# Patient Record
Sex: FEMALE | Race: WHITE | NOT HISPANIC OR LATINO | URBAN - METROPOLITAN AREA
[De-identification: names, ages, dates, MRNs, and addresses within clinical notes are randomized per-mention and may not be internally consistent; named-entity substitution may affect disease eponyms.]

---

## 2017-11-08 ENCOUNTER — CLINICAL SUPPORT (OUTPATIENT)
Dept: INTERNAL MEDICINE | Facility: CLINIC | Age: 37
End: 2017-11-08

## 2017-11-08 PROCEDURE — 80305 DRUG TEST PRSMV DIR OPT OBS: CPT | Mod: ,,, | Performed by: INTERNAL MEDICINE

## 2018-07-28 ENCOUNTER — HOSPITAL ENCOUNTER (EMERGENCY)
Facility: HOSPITAL | Age: 38
Discharge: HOME | End: 2018-07-28
Attending: EMERGENCY MEDICINE
Payer: MEDICAID

## 2018-07-28 VITALS
SYSTOLIC BLOOD PRESSURE: 145 MMHG | RESPIRATION RATE: 18 BRPM | DIASTOLIC BLOOD PRESSURE: 90 MMHG | HEART RATE: 69 BPM | OXYGEN SATURATION: 97 % | TEMPERATURE: 99 F | WEIGHT: 166.69 LBS

## 2018-07-28 DIAGNOSIS — S39.012A BACK STRAIN, INITIAL ENCOUNTER: ICD-10-CM

## 2018-07-28 DIAGNOSIS — S20.212A RIB CONTUSION, LEFT, INITIAL ENCOUNTER: Primary | ICD-10-CM

## 2018-07-28 DIAGNOSIS — R07.81 RIB PAIN ON LEFT SIDE: ICD-10-CM

## 2018-07-28 DIAGNOSIS — M54.9 LEFT-SIDED BACK PAIN: ICD-10-CM

## 2018-07-28 LAB
B-HCG UR QL: NEGATIVE
CTP QC/QA: YES

## 2018-07-28 PROCEDURE — 96372 THER/PROPH/DIAG INJ SC/IM: CPT

## 2018-07-28 PROCEDURE — 63600175 PHARM REV CODE 636 W HCPCS: Performed by: EMERGENCY MEDICINE

## 2018-07-28 PROCEDURE — 99284 EMERGENCY DEPT VISIT MOD MDM: CPT | Mod: 25

## 2018-07-28 PROCEDURE — 81025 URINE PREGNANCY TEST: CPT | Performed by: EMERGENCY MEDICINE

## 2018-07-28 RX ORDER — NAPROXEN 375 MG/1
375 TABLET ORAL 2 TIMES DAILY WITH MEALS
Qty: 20 TABLET | Refills: 0 | OUTPATIENT
Start: 2018-07-28 | End: 2020-08-26

## 2018-07-28 RX ORDER — CYCLOBENZAPRINE HCL 10 MG
10 TABLET ORAL 3 TIMES DAILY PRN
Qty: 15 TABLET | Refills: 0 | Status: SHIPPED | OUTPATIENT
Start: 2018-07-28 | End: 2018-08-02

## 2018-07-28 RX ORDER — KETOROLAC TROMETHAMINE 30 MG/ML
30 INJECTION, SOLUTION INTRAMUSCULAR; INTRAVENOUS
Status: COMPLETED | OUTPATIENT
Start: 2018-07-28 | End: 2018-07-28

## 2018-07-28 RX ADMIN — KETOROLAC TROMETHAMINE 30 MG: 30 INJECTION, SOLUTION INTRAMUSCULAR at 08:07

## 2018-07-29 NOTE — ED PROVIDER NOTES
Encounter Date: 7/28/2018    SCRIBE #1 NOTE: I, Davy Rae, am scribing for, and in the presence of,  Dr. Irvin. I have scribed the entire note.       History     Chief Complaint   Patient presents with    Fall     pt to triage ambulatory and reports a slip and fall in tub last pm and has left arm and mid back pain     Clarice Thao is a 39y/o female who  has no past medical history on file.    The patient presents to the ED due to a slip and fall that occurred yesterday night. Patient reports falling while in the shower and landed on her mid upper back. She admits to left upper back pain; described as a constant, sharp aching pain, worse with palpation and raising left arm. Patient denies head trauma, LOC, abd pain, or any other trauma. She has tried taking 800 mg Ibuprofen for pain without improvement. At this time, she has no other pain or complaints.        The history is provided by the patient.     Review of patient's allergies indicates:  No Known Allergies  No past medical history on file.  No past surgical history on file.  No family history on file.  Social History   Substance Use Topics    Smoking status: Not on file    Smokeless tobacco: Not on file    Alcohol use Not on file     Review of Systems   Constitutional: Negative for chills and fever.   HENT: Negative for congestion, rhinorrhea and sore throat.    Eyes: Negative for redness and visual disturbance.   Respiratory: Negative for cough, shortness of breath and wheezing.    Cardiovascular: Negative for chest pain and palpitations.   Gastrointestinal: Negative for abdominal pain, diarrhea, nausea and vomiting.   Genitourinary: Negative for dysuria and hematuria.   Musculoskeletal: Positive for back pain and myalgias. Negative for neck pain.   Skin: Negative for rash.   Neurological: Negative for syncope, weakness and light-headedness.   Psychiatric/Behavioral: Negative for confusion.   All other systems reviewed and are negative.      Physical  Exam     Initial Vitals [07/28/18 1930]   BP Pulse Resp Temp SpO2   133/83 86 18 98.3 °F (36.8 °C) 96 %      MAP       --         Physical Exam    Nursing note and vitals reviewed.  Constitutional: She appears well-developed and well-nourished. She is not diaphoretic. No distress.   HENT:   Head: Normocephalic and atraumatic.   Right Ear: External ear normal.   Left Ear: External ear normal.   Nose: Nose normal.   Mouth/Throat: Oropharynx is clear and moist.   Eyes: Conjunctivae and EOM are normal. Pupils are equal, round, and reactive to light. Right eye exhibits no discharge. Left eye exhibits no discharge.   Neck: Normal range of motion. Neck supple.   Cardiovascular: Normal rate, regular rhythm, normal heart sounds and intact distal pulses. Exam reveals no gallop and no friction rub.    No murmur heard.  Pulmonary/Chest: Breath sounds normal. No respiratory distress. She has no wheezes. She has no rhonchi. She has no rales.   Abdominal: Soft. Bowel sounds are normal. She exhibits no distension. There is no tenderness. There is no rebound and no guarding.   Left flank tenderness to palpation.   Musculoskeletal: She exhibits tenderness. She exhibits no edema.   Left sided paraspinal thoracic and left sided posterior rib TTP. No crepitus.  No midline cervical, thoracic, or lumbar spine tenderness to palpation. No bony tenderness or step-offs. Normal alignment to entire spine.    No deformity noted to all extremities.  Limited ROM of left shoulder secondary to pain.   Neurological: She is alert and oriented to person, place, and time. She has normal strength. No cranial nerve deficit or sensory deficit.   Skin: Skin is warm and dry. Capillary refill takes less than 2 seconds. No rash noted.   Psychiatric: She has a normal mood and affect. Thought content normal.         ED Course   Procedures  Labs Reviewed   POCT URINE PREGNANCY          Imaging Results          X-Ray Ribs 2 View Left (Final result)  Result time  07/28/18 21:05:28    Final result by Wolf Camacho MD (07/28/18 21:05:28)                 Impression:      No displaced left rib fracture or associated complication in the chest.      Electronically signed by: Wolf Camacho MD  Date:    07/28/2018  Time:    21:05             Narrative:    EXAMINATION:  XR RIBS 2 VIEW LEFT    CLINICAL HISTORY:  Pleurodynia.    TECHNIQUE:  Two views of the left ribs were performed.    COMPARISON:  None.    FINDINGS:  No displaced rib fracture is identified, noting that nondisplaced and anterior rib fractures may be radiographically occult.  No left pneumothorax is identified.  No subcutaneous emphysema in the left chest wall.  There is linear subsegmental atelectasis noted at the left lung base.                               X-Ray Shoulder Trauma Left (Final result)  Result time 07/28/18 21:02:25    Final result by Wolf Camacho MD (07/28/18 21:02:25)                 Impression:      No acute bony abnormality.      Electronically signed by: Wolf Camacho MD  Date:    07/28/2018  Time:    21:02             Narrative:    EXAMINATION:  XR SHOULDER TRAUMA 3 VIEW LEFT    CLINICAL HISTORY:  Dorsalgia, unspecified.    TECHNIQUE:  Three views of the left shoulder.    COMPARISON:  None.    FINDINGS:  No acute fracture, dislocation, or bony erosion. Soft tissues are symmetric.  No radiopaque foreign body.                                 Medical Decision Making:   Initial Assessment:   Clarice Thao is a 39y/o female who presents to the ED due to a slip and fall that occurred yesterday night. Plan to XR, manage pain, and reassess.  Differential Diagnosis:   Contusion, fracture, dislocation  Clinical Tests:   Lab Tests: Ordered and Reviewed  Radiological Study: Ordered and Reviewed  ED Management:  Upon re-evaluation, the patient's status has improved.  After complete ED evaluation, clinical impression is most consistent with contusion.  At this time, I feel there is no emergent  condition requiring further evaluation or admission. I believe the patient is stable for discharge from the ED. The patient and any additional family present were updated with test results, overall clinical impression, and recommended further plan of care. All questions were answered. The patient expressed understanding and agreed with current plan for discharge with PCP follow-up within 1 week. Strict return precautions were provided, including worsening pain, return/worsening of current symptoms or any other concerns.                      ED Course as of Jul 29 1020   Sat Jul 28, 2018 2043 Patient states that pain has resolved after toradol    [LD]      ED Course User Index  [LD] Aida Irvin MD     Clinical Impression:     1. Rib contusion, left, initial encounter    2. Rib pain on left side    3. Left-sided back pain    4. Back strain, initial encounter            Disposition:   Disposition: Discharged  Condition: Stable       I, Aida Irvin,  personally performed the services described in this documentation. All medical record entries made by the scribe were at my direction and in my presence.  I have reviewed the chart and agree that the record reflects my personal performance and is accurate and complete. Aida Irvin M.D. 8:49 PM07/29/2018                   Aida Irvin MD  07/29/18 1020

## 2018-07-29 NOTE — ED NOTES
States she slipped and fell in tub last night.  Complaining of left shoulder, left upper arm and pain in left upper back area.

## 2020-08-23 ENCOUNTER — HOSPITAL ENCOUNTER (EMERGENCY)
Facility: HOSPITAL | Age: 40
Discharge: HOME | End: 2020-08-23
Attending: EMERGENCY MEDICINE
Payer: MEDICAID

## 2020-08-23 VITALS
OXYGEN SATURATION: 96 % | DIASTOLIC BLOOD PRESSURE: 70 MMHG | HEIGHT: 65 IN | BODY MASS INDEX: 29.99 KG/M2 | WEIGHT: 180 LBS | RESPIRATION RATE: 18 BRPM | HEART RATE: 75 BPM | TEMPERATURE: 99 F | SYSTOLIC BLOOD PRESSURE: 149 MMHG

## 2020-08-23 DIAGNOSIS — L02.91 ABSCESS: Primary | ICD-10-CM

## 2020-08-23 DIAGNOSIS — L03.90 CELLULITIS, UNSPECIFIED CELLULITIS SITE: ICD-10-CM

## 2020-08-23 PROCEDURE — 25000003 PHARM REV CODE 250: Mod: ER | Performed by: EMERGENCY MEDICINE

## 2020-08-23 PROCEDURE — 63600175 PHARM REV CODE 636 W HCPCS: Mod: ER | Performed by: EMERGENCY MEDICINE

## 2020-08-23 PROCEDURE — 56405 I&D VULVA/PERINEAL ABSCESS: CPT | Mod: ER

## 2020-08-23 PROCEDURE — 96372 THER/PROPH/DIAG INJ SC/IM: CPT | Mod: ER,59

## 2020-08-23 PROCEDURE — 99284 EMERGENCY DEPT VISIT MOD MDM: CPT | Mod: 25,ER

## 2020-08-23 RX ORDER — SULFAMETHOXAZOLE AND TRIMETHOPRIM 800; 160 MG/1; MG/1
1 TABLET ORAL
Status: COMPLETED | OUTPATIENT
Start: 2020-08-23 | End: 2020-08-23

## 2020-08-23 RX ORDER — CLONAZEPAM 0.5 MG/1
0.5 TABLET ORAL 2 TIMES DAILY PRN
COMMUNITY

## 2020-08-23 RX ORDER — KETOROLAC TROMETHAMINE 30 MG/ML
30 INJECTION, SOLUTION INTRAMUSCULAR; INTRAVENOUS
Status: COMPLETED | OUTPATIENT
Start: 2020-08-23 | End: 2020-08-23

## 2020-08-23 RX ORDER — LIDOCAINE HYDROCHLORIDE 10 MG/ML
10 INJECTION, SOLUTION EPIDURAL; INFILTRATION; INTRACAUDAL; PERINEURAL
Status: COMPLETED | OUTPATIENT
Start: 2020-08-23 | End: 2020-08-23

## 2020-08-23 RX ORDER — SULFAMETHOXAZOLE AND TRIMETHOPRIM 800; 160 MG/1; MG/1
1 TABLET ORAL 2 TIMES DAILY
Qty: 14 TABLET | Refills: 0 | Status: SHIPPED | OUTPATIENT
Start: 2020-08-23 | End: 2020-08-30

## 2020-08-23 RX ORDER — MORPHINE SULFATE 4 MG/ML
4 INJECTION, SOLUTION INTRAMUSCULAR; INTRAVENOUS
Status: DISCONTINUED | OUTPATIENT
Start: 2020-08-23 | End: 2020-08-23

## 2020-08-23 RX ORDER — HYDROCODONE BITARTRATE AND ACETAMINOPHEN 5; 325 MG/1; MG/1
1 TABLET ORAL EVERY 4 HOURS PRN
Qty: 8 TABLET | Refills: 0 | Status: SHIPPED | OUTPATIENT
Start: 2020-08-23 | End: 2020-08-26 | Stop reason: SDUPTHER

## 2020-08-23 RX ORDER — MONTELUKAST SODIUM 10 MG/1
10 TABLET ORAL NIGHTLY
COMMUNITY

## 2020-08-23 RX ORDER — SERTRALINE HYDROCHLORIDE 100 MG/1
100 TABLET, FILM COATED ORAL DAILY
COMMUNITY

## 2020-08-23 RX ADMIN — SULFAMETHOXAZOLE AND TRIMETHOPRIM 1 TABLET: 800; 160 TABLET ORAL at 10:08

## 2020-08-23 RX ADMIN — KETOROLAC TROMETHAMINE 30 MG: 30 INJECTION, SOLUTION INTRAMUSCULAR at 09:08

## 2020-08-23 RX ADMIN — LIDOCAINE HYDROCHLORIDE 100 MG: 10 INJECTION, SOLUTION EPIDURAL; INFILTRATION; INTRACAUDAL; PERINEURAL at 09:08

## 2020-08-23 NOTE — ED PROVIDER NOTES
"Encounter Date: 8/23/2020    COVID Statement  The  of Health and Human Services and Rowdy Puentes, Governor of the [facility], have declared a State of Public Health Emergency due to the spread of a novel coronavirus and disease (COVID-19).  There is no currently accepted treatment except conservative measures and respiratory support if appropriate.  This has lead to significant resource capacity and potential delays in care.            History     Chief Complaint   Patient presents with    Abscess     Pt states "I have an abscess all the way at the top of my leg, like almost in my private. I think it's infected." Pt states symptoms started on Thursday.      Patient is 39 y/o female with no significant PMHx who presents c/o pain and swelling to left labia, worsening for 4 days.  Pain is sharp and worse with movement and palpation  There are no alleviating factors  Denies any drainage.   Denies fever/chills.  Denies other symptoms.  She has not had anything like this in the past.    LMP was last week    The history is provided by the patient.     Review of patient's allergies indicates:  No Known Allergies  History reviewed. No pertinent past medical history.  History reviewed. No pertinent surgical history.  History reviewed. No pertinent family history.  Social History     Tobacco Use    Smoking status: Current Every Day Smoker     Packs/day: 0.50   Substance Use Topics    Alcohol use: Never     Frequency: Never    Drug use: Never     Review of Systems   Constitutional: Negative for chills and fever.   HENT: Negative for congestion and rhinorrhea.    Eyes: Negative for pain and visual disturbance.   Respiratory: Negative for cough and shortness of breath.    Cardiovascular: Negative for chest pain and leg swelling.   Gastrointestinal: Negative for abdominal pain, constipation, diarrhea and vomiting.   Genitourinary: Negative for dysuria and hematuria.   Musculoskeletal: Negative for neck " pain.   Skin:        Abscess     Neurological: Negative for headaches.   Psychiatric/Behavioral: Negative for confusion.       Physical Exam     Initial Vitals [08/23/20 0905]   BP Pulse Resp Temp SpO2   (!) 160/89 101 19 98.9 °F (37.2 °C) 96 %      MAP       --         Physical Exam    Nursing note and vitals reviewed.  Constitutional: She appears well-developed and well-nourished. She is not diaphoretic. No distress.   HENT:   Head: Normocephalic and atraumatic.   Right Ear: External ear normal.   Left Ear: External ear normal.   Eyes: EOM are normal.   Cardiovascular: Normal rate, regular rhythm and normal heart sounds. Exam reveals no friction rub.    No murmur heard.  Pulmonary/Chest: Breath sounds normal. No respiratory distress. She has no wheezes. She has no rhonchi. She has no rales.   Abdominal: Soft. There is no abdominal tenderness. There is no rebound.   Musculoskeletal: Normal range of motion. No edema.   Neurological: She is alert and oriented to person, place, and time. GCS score is 15. GCS eye subscore is 4. GCS verbal subscore is 5. GCS motor subscore is 6.   Skin: Skin is warm and dry. Capillary refill takes less than 2 seconds.   2 cm diameter area of erythema, swelling, and TTP to left labia.  + fluctuance, no drainage.  Findings consistent with abscess   Psychiatric: She has a normal mood and affect.         ED Course   I & D - Incision and Drainage    Date/Time: 8/23/2020 10:17 AM  Location procedure was performed: [facility] EMERGENCY DEPARTMENT  Performed by: Aida Irvin MD  Authorized by: Aida Irvin MD   Type: abscess  Body area: anogenital (left labia)  Anesthesia: local infiltration    Anesthesia:  Local Anesthetic: lidocaine 1% without epinephrine  Anesthetic total: 1 mL  Scalpel size: 11  Incision type: single straight  Complexity: simple  Drainage: purulent and  bloody  Drainage amount: moderate  Wound treatment: wound left open,  drainage,  incision and   deloculation  Complications: No  Specimens: No  Implants: No  Patient tolerance: Patient tolerated the procedure well with no immediate complications    ED US Soft Tissue/MSK    Date/Time: 8/23/2020 10:21 AM  Performed by: Aida Irvin MD  Authorized by: Aida Irvin MD   Comments: US of left labia reveals superficial fluid collection approximately 2 cm in diameter in soft tissue consistent with abscess.        Labs Reviewed - No data to display       Imaging Results    None          Medical Decision Making:   Initial Assessment:   Patient here with abscess  Differential Diagnosis:   Abscess, cellulitis, bartholins cyst  ED Management:    On re-evaluation, the patient's status has improved.  After complete ED evaluation, clinical impression is most consistent with abscess, cellulitis.  I&D performed, purulent fluid expressed.  Patient tolerated well  OBGYN follow-up within 2-3 days was recommended.    After taking into careful account the patient's history, physical exam findings, as well as empirical and objective data obtained throughout ED workup, I feel no emergent medical condition has been identified. No further evaluation or admission was felt to be required, and the patient is stable for discharge from the ED. The patient and any additional family present were updated with test results, overall clinical impression, and recommended further plan of care, including discharge instructions as provided and outpatient follow-up for continued evaluation and management as needed. All questions were answered. The patient expressed understanding and agreed with current plan for discharge and follow-up plan of care. Strict ED return precautions were provided, including return/worsening of current symptoms, new symptoms, or any other concerns.                     ED Course as of Aug 23 1023   Sun Aug 23, 2020   0909 BP(!): 160/89 [LD]   0909 Temp: 98.9 °F (37.2 °C) [LD]   0909 Pulse: 101 [LD]   0909 Resp: 19  [LD]   0909 SpO2: 96 % [LD]      ED Course User Index  [LD] Aida Irvin MD                Clinical Impression:       ICD-10-CM ICD-9-CM   1. Abscess  L02.91 682.9   2. Cellulitis, unspecified cellulitis site  L03.90 682.9         Disposition:   Disposition: Discharged  Condition: Stable     ED Disposition Condition    Discharge Stable        ED Prescriptions     Medication Sig Dispense Start Date End Date Auth. Provider    sulfamethoxazole-trimethoprim 800-160mg (BACTRIM DS) 800-160 mg Tab Take 1 tablet by mouth 2 (two) times daily. for 7 days 14 tablet 8/23/2020 8/30/2020 Aida Irvin MD    HYDROcodone-acetaminophen (NORCO) 5-325 mg per tablet Take 1 tablet by mouth every 4 (four) hours as needed for Pain. 8 tablet 8/23/2020  Aida Irvin MD        Follow-up Information     Follow up With Specialties Details Why Contact Info    Nila Kim MD Obstetrics and Gynecology Schedule an appointment as soon as possible for a visit in 2 days For wound re-check [address] [place] [address]  [phone]                                       Aida Irvin MD  08/23/20 1029       Aida Irvin MD  08/23/20 4845

## 2020-08-26 ENCOUNTER — HOSPITAL ENCOUNTER (EMERGENCY)
Facility: HOSPITAL | Age: 40
Discharge: HOME | End: 2020-08-26
Attending: EMERGENCY MEDICINE
Payer: MEDICAID

## 2020-08-26 VITALS
BODY MASS INDEX: 29.99 KG/M2 | WEIGHT: 180 LBS | HEART RATE: 111 BPM | DIASTOLIC BLOOD PRESSURE: 82 MMHG | TEMPERATURE: 98 F | OXYGEN SATURATION: 97 % | HEIGHT: 65 IN | RESPIRATION RATE: 20 BRPM | SYSTOLIC BLOOD PRESSURE: 128 MMHG

## 2020-08-26 DIAGNOSIS — N76.4 LEFT GENITAL LABIAL ABSCESS: Primary | ICD-10-CM

## 2020-08-26 PROCEDURE — 99283 EMERGENCY DEPT VISIT LOW MDM: CPT | Mod: ER

## 2020-08-26 PROCEDURE — 25000003 PHARM REV CODE 250: Mod: ER | Performed by: PHYSICIAN ASSISTANT

## 2020-08-26 RX ORDER — HYDROCODONE BITARTRATE AND ACETAMINOPHEN 5; 325 MG/1; MG/1
1 TABLET ORAL EVERY 4 HOURS PRN
Qty: 8 TABLET | Refills: 0 | Status: SHIPPED | OUTPATIENT
Start: 2020-08-26

## 2020-08-26 RX ORDER — HYDROCODONE BITARTRATE AND ACETAMINOPHEN 5; 325 MG/1; MG/1
1 TABLET ORAL
Status: COMPLETED | OUTPATIENT
Start: 2020-08-26 | End: 2020-08-26

## 2020-08-26 RX ADMIN — HYDROCODONE BITARTRATE AND ACETAMINOPHEN 1 TABLET: 5; 325 TABLET ORAL at 11:08

## 2020-08-26 NOTE — Clinical Note
Clarice Thao was seen and treated in our emergency department on 8/26/2020.  She may return to work on 08/29/2020.       If you have any questions or concerns, please don't hesitate to call.      FLORIN Verdin

## 2020-08-26 NOTE — ED PROVIDER NOTES
Encounter Date: 8/26/2020       History     Chief Complaint   Patient presents with    Abscess     patient states pain in her groin area      Patient is a 40-year-old female who was seen in the emergency department 2 days ago and had I&D of an abscess to the left labia.  She is presenting today with complaint of persistent induration and tenderness.  She did have a significant amount of drainage the day after she was seen in the emergency department but that has stopped.  No fever chills. She has been taking Bactrim b.i.d. as prescribed.  She does not feel that the abscess is worse but it is just not better yet and she is concerned there is another one.         Review of patient's allergies indicates:  No Known Allergies  No past medical history on file.  No past surgical history on file.  No family history on file.  Social History     Tobacco Use    Smoking status: Current Every Day Smoker     Packs/day: 0.50   Substance Use Topics    Alcohol use: Never     Frequency: Never    Drug use: Never     Review of Systems   Constitutional: Negative for activity change, appetite change, chills, fatigue and fever.   HENT: Negative for congestion, ear pain, rhinorrhea, sore throat and voice change.    Respiratory: Negative for cough, shortness of breath and wheezing.    Cardiovascular: Negative for chest pain.   Gastrointestinal: Negative for abdominal pain, diarrhea and vomiting.   Musculoskeletal: Negative for back pain, neck pain and neck stiffness.   Skin: Positive for wound. Negative for rash.   Neurological: Negative for dizziness, weakness, numbness and headaches.   All other systems reviewed and are negative.      Physical Exam     Initial Vitals [08/26/20 1135]   BP Pulse Resp Temp SpO2   128/82 (!) 111 20 98.2 °F (36.8 °C) 97 %      MAP       --         Physical Exam    Nursing note and vitals reviewed.  Constitutional: She appears well-developed and well-nourished. She appears distressed.   HENT:   Head:  Normocephalic.   Nose: Nose normal.   Mouth/Throat: Oropharynx is clear and moist.   Eyes: Conjunctivae and EOM are normal. Pupils are equal, round, and reactive to light.   Neck: Normal range of motion. Neck supple.   Cardiovascular: Normal rate, regular rhythm, normal heart sounds and intact distal pulses.   Pulmonary/Chest: Breath sounds normal. No respiratory distress.   Genitourinary:    Genitourinary Comments: 1 cm erythematous area with 2 cm surrounding induration.  The wound is well opened with no more purulent drainage. No fluctuance.  No new lesions.     Musculoskeletal: No edema.   Lymphadenopathy:     She has no cervical adenopathy.   Neurological: She is alert and oriented to person, place, and time.   Skin: Skin is warm and dry. No rash noted.   Psychiatric: She has a normal mood and affect. Her behavior is normal. Judgment and thought content normal.         ED Course   Procedures  Labs Reviewed - No data to display       Imaging Results    None          Medical Decision Making:   Continue antibiotics. No need for repeat I&D. Return to the ED if worse in any way.                                 Clinical Impression:       ICD-10-CM ICD-9-CM   1. Left genital labial abscess  N76.4 616.4             ED Disposition Condition    Discharge Stable        ED Prescriptions     Medication Sig Dispense Start Date End Date Auth. Provider    HYDROcodone-acetaminophen (NORCO) 5-325 mg per tablet Take 1 tablet by mouth every 4 (four) hours as needed for Pain. 8 tablet 8/26/2020  FLORIN Verdin        Follow-up Information    None                                    FLORIN Verdin  08/26/20 [id number]

## 2024-02-22 ENCOUNTER — HOSPITAL ENCOUNTER (EMERGENCY)
Facility: HOSPITAL | Age: 44
Discharge: HOME | End: 2024-02-22
Attending: EMERGENCY MEDICINE

## 2024-02-22 VITALS
WEIGHT: 160 LBS | DIASTOLIC BLOOD PRESSURE: 97 MMHG | OXYGEN SATURATION: 97 % | HEIGHT: 65 IN | HEART RATE: 74 BPM | SYSTOLIC BLOOD PRESSURE: 169 MMHG | BODY MASS INDEX: 26.66 KG/M2 | TEMPERATURE: 98 F | RESPIRATION RATE: 20 BRPM

## 2024-02-22 DIAGNOSIS — M62.830 LUMBAR PARASPINAL MUSCLE SPASM: ICD-10-CM

## 2024-02-22 DIAGNOSIS — M54.41 ACUTE RIGHT-SIDED LOW BACK PAIN WITH RIGHT-SIDED SCIATICA: Primary | ICD-10-CM

## 2024-02-22 DIAGNOSIS — Z75.8 DOES NOT HAVE PRIMARY CARE PROVIDER: ICD-10-CM

## 2024-02-22 LAB
B-HCG UR QL: NEGATIVE
CTP QC/QA: YES

## 2024-02-22 PROCEDURE — 96372 THER/PROPH/DIAG INJ SC/IM: CPT

## 2024-02-22 PROCEDURE — 63600175 PHARM REV CODE 636 W HCPCS: Mod: ER

## 2024-02-22 PROCEDURE — 81025 URINE PREGNANCY TEST: CPT | Mod: ER

## 2024-02-22 PROCEDURE — 99284 EMERGENCY DEPT VISIT MOD MDM: CPT | Mod: 25,ER

## 2024-02-22 PROCEDURE — 25000003 PHARM REV CODE 250: Mod: ER

## 2024-02-22 RX ORDER — METHOCARBAMOL 500 MG/1
1000 TABLET, FILM COATED ORAL
Status: COMPLETED | OUTPATIENT
Start: 2024-02-22 | End: 2024-02-22

## 2024-02-22 RX ORDER — KETOROLAC TROMETHAMINE 30 MG/ML
15 INJECTION, SOLUTION INTRAMUSCULAR; INTRAVENOUS
Status: COMPLETED | OUTPATIENT
Start: 2024-02-22 | End: 2024-02-22

## 2024-02-22 RX ORDER — NAPROXEN 500 MG/1
500 TABLET ORAL 2 TIMES DAILY WITH MEALS
Qty: 60 TABLET | Refills: 0 | Status: SHIPPED | OUTPATIENT
Start: 2024-02-22

## 2024-02-22 RX ORDER — KETOROLAC TROMETHAMINE 10 MG/1
10 TABLET, FILM COATED ORAL EVERY 6 HOURS
Qty: 20 TABLET | Refills: 0 | Status: SHIPPED | OUTPATIENT
Start: 2024-02-22 | End: 2024-02-27

## 2024-02-22 RX ORDER — LIDOCAINE 50 MG/G
1 PATCH TOPICAL DAILY
Qty: 5 PATCH | Refills: 0 | Status: SHIPPED | OUTPATIENT
Start: 2024-02-22

## 2024-02-22 RX ORDER — METHOCARBAMOL 750 MG/1
750 TABLET, FILM COATED ORAL 4 TIMES DAILY
Qty: 40 TABLET | Refills: 0 | Status: SHIPPED | OUTPATIENT
Start: 2024-02-22 | End: 2024-03-03

## 2024-02-22 RX ORDER — LIDOCAINE 50 MG/G
1 PATCH TOPICAL
Status: DISCONTINUED | OUTPATIENT
Start: 2024-02-22 | End: 2024-02-22 | Stop reason: HOSPADM

## 2024-02-22 RX ADMIN — METHOCARBAMOL 1000 MG: 500 TABLET ORAL at 02:02

## 2024-02-22 RX ADMIN — LIDOCAINE 1 PATCH: 50 PATCH CUTANEOUS at 02:02

## 2024-02-22 RX ADMIN — KETOROLAC TROMETHAMINE 15 MG: 30 INJECTION, SOLUTION INTRAMUSCULAR; INTRAVENOUS at 02:02

## 2024-02-22 NOTE — ED NOTES
PT presents to the ED with C/O right sided back pain that radiates down leg x 1 week. +numbness/tingling. Denies recent injury.

## 2024-02-22 NOTE — DISCHARGE INSTRUCTIONS
You have been prescribed Toradol and Naproxen for pain. Please take the Toradol prescription to completion first. The Naproxen prescription is for you to take following completion of the Toradol. They are an Non-Steroidal Anti-Inflammatory (NSAID) Medication. Please do not take any additional NSAIDs while you are taking this medication including (Advil, Aleve, Motrin, Ibuprofen, Mobic\meloxicam, Naprosyn, Toradol, ketoralac, etc.). Please stop taking this medication if you experience: weakness, itching, yellow skin or eyes, joint pains, vomiting blood, blood or black stools, unusual weight gain, or swelling in your arms, legs, hands, or feet.     You have been prescribed Robaxin (Methocarbamol) for muscle spasms/pain. Please do not take this medication while working, drinking alcohol, swimming, or while driving/operating heavy machinery. This medication may cause drowsiness, dizziness, impair judgment, and reduce physical capabilities.You should not drive, operate heavy machinery, or make life changing decisions while taking this medication.

## 2024-02-22 NOTE — Clinical Note
"Clarice Lawrence" Keesha was seen and treated in our emergency department on 2/22/2024.  She may return with limitations on 02/24/2024.  No heavy lifting greater than 5 pounds until cleared by another provider.      Sincerely,      Judy Rivera PA-C    "

## 2024-02-22 NOTE — Clinical Note
"Clarice Lawrence" Keesha was seen and treated in our emergency department on 2/22/2024.  She may return to work on 02/24/2024.       If you have any questions or concerns, please don't hesitate to call.      Judy Rivera PA-C"

## 2024-02-22 NOTE — ED PROVIDER NOTES
Encounter Date: 2/22/2024       History     Chief Complaint   Patient presents with    Back Pain     Lower right back pain, radiates down leg, started 1 week ago      Clarice Thao is a 43 y.o. female  has no past medical history on file. presenting to the Emergency Department for Right-sided low back pain that radiates down the right leg x1 week.  Reports numbness and tingling down to the ankle.  No injury or trauma.  No increase in activity.  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  No fevers or chills.  No urinary symptoms.  No night sweats.  No urinary or bowel incontinence.  No saddle anesthesia.  No history of IV drug use or cancer.    The history is provided by the patient.     Review of patient's allergies indicates:  No Known Allergies  History reviewed. No pertinent past medical history.  History reviewed. No pertinent surgical history.  History reviewed. No pertinent family history.  Social History     Tobacco Use    Smoking status: Every Day     Current packs/day: 0.50     Types: Cigarettes   Substance Use Topics    Alcohol use: Never    Drug use: Never     Review of Systems   Constitutional:  Negative for fever.   HENT:  Negative for sore throat.    Respiratory:  Negative for shortness of breath.    Cardiovascular:  Negative for chest pain.   Gastrointestinal:  Negative for nausea.   Genitourinary:  Negative for dysuria.   Musculoskeletal:  Positive for back pain.   Skin:  Negative for rash.   Neurological:  Negative for weakness.   Hematological:  Does not bruise/bleed easily.   All other systems reviewed and are negative.      Physical Exam     Initial Vitals [02/22/24 1337]   BP Pulse Resp Temp SpO2   (!) 149/80 88 18 98 °F (36.7 °C) 98 %      MAP       --         Physical Exam    Nursing note and vitals reviewed.  Constitutional: She appears well-developed and well-nourished. She is not diaphoretic. No distress.   HENT:   Head: Normocephalic.   Right Ear: Hearing, tympanic  membrane and external ear normal.   Left Ear: Hearing, tympanic membrane and external ear normal.   Nose: Nose normal.   Mouth/Throat: Oropharynx is clear and moist.   Eyes: Conjunctivae, EOM and lids are normal. Pupils are equal, round, and reactive to light.   Neck: Neck supple.   Normal range of motion.  Cardiovascular:  Normal rate, regular rhythm and normal heart sounds.           Pulmonary/Chest: Breath sounds normal. No respiratory distress. She has no wheezes. She has no rhonchi.   Abdominal: Abdomen is soft. Bowel sounds are normal. There is no abdominal tenderness. There is no rebound and no guarding.   Musculoskeletal:      Cervical back: Normal range of motion and neck supple. No rigidity.      Lumbar back: Spasms and tenderness present. No bony tenderness. Normal range of motion. Positive right straight leg raise test. Negative left straight leg raise test.        Back:      Lymphadenopathy:     She has no cervical adenopathy.   Neurological: She is alert and oriented to person, place, and time. She has normal strength. GCS score is 15. GCS eye subscore is 4. GCS verbal subscore is 5. GCS motor subscore is 6.   Skin: Skin is warm and dry. Capillary refill takes less than 2 seconds. No rash noted.   Psychiatric: She has a normal mood and affect. Her behavior is normal. Judgment and thought content normal.         ED Course   Procedures  Labs Reviewed   POCT URINE PREGNANCY          Imaging Results    None          Medications   LIDOcaine 5 % patch 1 patch (1 patch Transdermal Patch Applied 2/22/24 1428)   ketorolac injection 15 mg (15 mg Intramuscular Given 2/22/24 1428)   methocarbamoL tablet 1,000 mg (1,000 mg Oral Given 2/22/24 1428)     Medical Decision Making  This is an evaluation of a 43 y.o. female that presents to the Emergency Department for back pain.  Denies fever, rash, night sweats, weight loss, dysuria, bowel/bladder incontinence, immunosuppression or IV\SQ drug use. The patient is a in no  apparent distress, well developed and well nourished, non-toxic, in no respiratory distress and acyanotic, alert, oriented times 3, afebrile, and normal vitals female. On physical exam, there is tenderness to the right lumbar paraspinals with spasm. There is no abdominal tenderness to palpation, CVA tenderness, saddle anesthesia, rash, erythema, or open wounds. Strength and sensation are symmetric bilaterally. Positive straight leg raise test right. Upper and lower extremity pulses are symmetrical.     Given the above findings, my overall impression is low back pain w spasm and sciatica. Differential diagnosis including but not limited to acute vertebral fracture, subluxation, dislocation, sciatica, epidural abscess, cauda equina, shingles, UTI, pyelonephritis, lumbar sprain, strain, spondylolisthesis, spondylolysis, spondylosis, paraspinal infection    Clinical picture consistent with benign musculoskeletal back pain without any red flags to suggest alternate dangerous etiology of symptoms or indicate need for emergent imaging. Will treat symptomatically.     Discharge Meds/Instructions: toradol, robaxin, lidoderm, patch. PCP f/u    There does not appear to be any indication for further emergent testing, observation, or hospitalization at this time. A mutual shared decision making discussion was had with the patient. Patient appears stable for and is comfortable with discharge home. The diagnosis, treatment plan, instructions for follow-up as well as ED return precautions were discussed. Advised to follow-up with PCP for outpatient follow-up in 2-3 days. Signs and symptoms that would warrant immediate return to ED were reviewed prior to discharge. All questions and concerns were asked, answered, and addressed. Patient expressed understanding and agreement with the plan.     Amount and/or Complexity of Data Reviewed  Labs: ordered. Decision-making details documented in ED Course.    Risk  Prescription drug  management.               ED Course as of 02/22/24 1548   Thu Feb 22, 2024   1408 hCG Qualitative, Urine: Negative [LH]   1509 On re-evaluation, patient reporting some symptom improvement.  Patient is feeling comfortable with stable for discharge home at this time.  Advised patient if she wanted to follow up with the primary care provider.  A referral will be placed for her on discharge. [LH]      ED Course User Index  [LH] Judy Rivera PA-C                           Clinical Impression:  Final diagnoses:  [M54.41] Acute right-sided low back pain with right-sided sciatica (Primary)  [M62.830] Lumbar paraspinal muscle spasm  [Z75.8] Does not have primary care provider          ED Disposition Condition    Discharge Stable          ED Prescriptions       Medication Sig Dispense Start Date End Date Auth. Provider    ketorolac (TORADOL) 10 mg tablet Take 1 tablet (10 mg total) by mouth every 6 (six) hours. for 5 days 20 tablet 2/22/2024 2/27/2024 Judy Rivera PA-C    methocarbamoL (ROBAXIN) 750 MG Tab Take 1 tablet (750 mg total) by mouth 4 (four) times daily. for 10 days 40 tablet 2/22/2024 3/3/2024 Judy Rivera PA-C    naproxen (NAPROSYN) 500 MG tablet Take 1 tablet (500 mg total) by mouth 2 (two) times daily with meals. 60 tablet 2/22/2024 -- Judy Rivera PA-C    LIDOcaine (LIDODERM) 5 % Place 1 patch onto the skin once daily. Remove & Discard patch within 12 hours or as directed by MD 5 patch 2/22/2024 -- Judy Rivera PA-C          Follow-up Information    None          Judy Rivera PA-C  02/22/24 1549

## 2024-03-07 NOTE — ED NOTES
Patient identifiers for Clarice Thao checked and correct.  LOC: The patient is awake, alert and aware of environment with an appropriate affect, the patient is oriented x 3 and speaking appropriately.  APPEARANCE: Patient uncomfortable and in no acute distress, patient is clean and well groomed, patient's clothing are properly fastened.  SKIN: The skin is warm and dry, patient has normal skin turgor and moist mucus membranes, skin intact, no breakdown or brusing noted.  MUSKULOSKELETAL: Patient moving upper right and lower extremities.  States it hurts too much to move left arm to side.  +radial pulse, good movement and sensation in fingers.     Negative

## 2024-06-13 ENCOUNTER — HOSPITAL ENCOUNTER (EMERGENCY)
Facility: HOSPITAL | Age: 44
Discharge: HOME | End: 2024-06-13
Attending: EMERGENCY MEDICINE

## 2024-06-13 VITALS
SYSTOLIC BLOOD PRESSURE: 147 MMHG | DIASTOLIC BLOOD PRESSURE: 109 MMHG | WEIGHT: 160 LBS | BODY MASS INDEX: 26.66 KG/M2 | TEMPERATURE: 98 F | HEART RATE: 98 BPM | HEIGHT: 65 IN | RESPIRATION RATE: 18 BRPM | OXYGEN SATURATION: 100 %

## 2024-06-13 DIAGNOSIS — J06.9 VIRAL URI WITH COUGH: Primary | ICD-10-CM

## 2024-06-13 DIAGNOSIS — R06.2 WHEEZING: ICD-10-CM

## 2024-06-13 LAB
INFLUENZA A, MOLECULAR: NEGATIVE
INFLUENZA B, MOLECULAR: NEGATIVE
SARS-COV-2 RDRP RESP QL NAA+PROBE: NEGATIVE
SPECIMEN SOURCE: NORMAL

## 2024-06-13 PROCEDURE — 94640 AIRWAY INHALATION TREATMENT: CPT | Mod: ER,XB

## 2024-06-13 PROCEDURE — 99285 EMERGENCY DEPT VISIT HI MDM: CPT | Mod: 25,ER

## 2024-06-13 PROCEDURE — U0002 COVID-19 LAB TEST NON-CDC: HCPCS | Mod: ER

## 2024-06-13 PROCEDURE — 25000242 PHARM REV CODE 250 ALT 637 W/ HCPCS: Mod: ER

## 2024-06-13 PROCEDURE — 87502 INFLUENZA DNA AMP PROBE: CPT | Mod: ER

## 2024-06-13 RX ORDER — ALBUTEROL SULFATE 90 UG/1
1-2 AEROSOL, METERED RESPIRATORY (INHALATION) EVERY 6 HOURS PRN
Qty: 8 G | Refills: 0 | Status: SHIPPED | OUTPATIENT
Start: 2024-06-13 | End: 2025-06-13

## 2024-06-13 RX ORDER — AZELASTINE 1 MG/ML
1 SPRAY, METERED NASAL 2 TIMES DAILY
Qty: 30 ML | Refills: 0 | Status: SHIPPED | OUTPATIENT
Start: 2024-06-13 | End: 2025-06-13

## 2024-06-13 RX ORDER — PREDNISONE 20 MG/1
20 TABLET ORAL DAILY
Qty: 5 TABLET | Refills: 0 | Status: SHIPPED | OUTPATIENT
Start: 2024-06-13 | End: 2024-06-18

## 2024-06-13 RX ORDER — IPRATROPIUM BROMIDE AND ALBUTEROL SULFATE 2.5; .5 MG/3ML; MG/3ML
3 SOLUTION RESPIRATORY (INHALATION)
Status: COMPLETED | OUTPATIENT
Start: 2024-06-13 | End: 2024-06-13

## 2024-06-13 RX ORDER — GUAIFENESIN 600 MG/1
1200 TABLET, EXTENDED RELEASE ORAL 2 TIMES DAILY
Qty: 40 TABLET | Refills: 0 | Status: SHIPPED | OUTPATIENT
Start: 2024-06-13 | End: 2024-06-23

## 2024-06-13 RX ORDER — CETIRIZINE HYDROCHLORIDE 10 MG/1
10 TABLET ORAL DAILY
Qty: 14 TABLET | Refills: 0 | Status: SHIPPED | OUTPATIENT
Start: 2024-06-13 | End: 2024-06-27

## 2024-06-13 RX ORDER — BENZONATATE 100 MG/1
100 CAPSULE ORAL 2 TIMES DAILY
Qty: 10 CAPSULE | Refills: 0 | Status: SHIPPED | OUTPATIENT
Start: 2024-06-13 | End: 2024-06-18

## 2024-06-13 RX ADMIN — IPRATROPIUM BROMIDE AND ALBUTEROL SULFATE 3 ML: 2.5; .5 SOLUTION RESPIRATORY (INHALATION) at 02:06

## 2024-06-13 NOTE — DISCHARGE INSTRUCTIONS
Thank you for letting me care for you today - it was nice to meet you and I hope you feel better soon. Please return to the ER if your symptoms don't improve or get worse. And be sure to follow up with your primary care provider within the next week if not improving.     I have placed a referral to [facility]. You can call [phone] to schedule.     Rotate between Tylenol and ibuprofen (Motrin), switching every 3 hours. For example, Tylenol at 8am, ibuprofen at 11am, tylenol at 2pm. See attached chart for dosing.     I have sent in the following prescriptions:  Albuterol: 4 times per day for 3-4 days, then as needed for wheezing  Prednisone: 20mg once a day X 5 days.   Zyrtec: Please take 1 tablet by mouth daily  Mucinex:  2 tablets by mouth 2 times daily  Astelin nasal spray:  1 spray per nostril 2 times per day  Tessalon:  1 capsule by mouth, 2 times a day as needed if you are having coughing fits.    Our goal at [facility] is to always give you outstanding care and exceptional service. You may receive a survey by mail or email in the next week about your experience in our ED. We would greatly appreciate you completing and returning the survey. Your feedback provides us with a way to recognize our staff who give very good care and it helps us learn how to improve when your experience was below our aspiration of excellence.     All the best,     Gayla Fraser, MPH, PA-C  Emergency Department Physician Assistant  [facility]

## 2024-06-13 NOTE — ED NOTES
Two patient identifiers have been checked and are correct.      Appearance: Pt awake, alert & oriented to person, place & time. Pt in no acute distress at present time. Pt is clean and well groomed with clothes appropriately fastened.   Skin: Skin warm, dry & intact. Color consistent with ethnicity. Mucous membranes moist. No breakdown or brusing noted.   Musculoskeletal: Patient moving all extremities well, no obvious swelling or deformities noted.   Respiratory: Respirations spontaneous, even, and non-labored. Visible chest rise noted. Airway is open and patent. No accessory muscle use noted. Reports cough and SOB  Neurologic: Sensation is intact. Speech is clear and appropriate. Eyes open spontaneously, behavior appropriate to situation, follows commands, facial expression symmetrical, bilateral hand grasp equal and even, purposeful motor response noted.  Cardiac: All peripheral pulses present. No Bilateral lower extremity edema. Cap refill is <3 seconds.  Abdomen: Abdomen soft, non-tender to palpation.   : Pt reports no dysuria or hematuria.

## 2024-06-13 NOTE — ED PROVIDER NOTES
Encounter Date: 6/13/2024       History     Chief Complaint   Patient presents with    Shortness of Breath     Pt reports SOB X 3 weeks with cough. PMH of bronchitis. No meds. PA in triage.     Patient is a 44 y.o. female who presents with cough X 3 weeks. Reports it is hard to catch her breath during coughing fits. No SOB at rest. Patient does not reports close contacts with similar symptoms. Patient has taken no medication for symptom relief.  Denies fever, headache, chest pain, sore throat, stridor, drooling, NVD, abdominal pain, constipation, urinary problems, joint problems, rashes, or any other complaints at this time.      The history is provided by the patient.     Review of patient's allergies indicates:  No Known Allergies  History reviewed. No pertinent past medical history.  History reviewed. No pertinent surgical history.  No family history on file.  Social History     Tobacco Use    Smoking status: Every Day     Current packs/day: 0.50     Types: Cigarettes   Substance Use Topics    Alcohol use: Never    Drug use: Never     Review of Systems   Constitutional:  Negative for chills and fever.   HENT:  Negative for congestion, drooling, ear discharge, ear pain, facial swelling, postnasal drip, rhinorrhea, sore throat, trouble swallowing and voice change.    Respiratory:  Positive for cough and wheezing. Negative for shortness of breath and stridor.    Cardiovascular:  Negative for chest pain, palpitations and leg swelling.   Gastrointestinal:  Negative for abdominal distention, abdominal pain, diarrhea, nausea and vomiting.   Genitourinary:  Negative for dysuria.   Musculoskeletal:  Negative for back pain, neck pain and neck stiffness.   Skin:  Negative for rash.   Neurological:  Negative for weakness and headaches.   Hematological:  Does not bruise/bleed easily.   All other systems reviewed and are negative.      Physical Exam     Initial Vitals [06/13/24 1349]   BP Pulse Resp Temp SpO2   (!) 147/109 103  19 98.4 °F (36.9 °C) 96 %      MAP       --         Physical Exam    Vitals reviewed.  Constitutional: She appears well-developed and well-nourished.   HENT:   Head: Normocephalic and atraumatic.   Nose: Rhinorrhea present.   Mouth/Throat: No trismus in the jaw.   Eyes: Conjunctivae and EOM are normal.   Neck:   Normal range of motion.  Cardiovascular:  Normal rate and regular rhythm.           Pulmonary/Chest: No stridor. No respiratory distress. She has wheezes (mild diffuse wheezing, all lung fields). She has no rhonchi. She has no rales. She exhibits no tenderness.   Abdominal: Abdomen is soft. She exhibits no distension. There is no abdominal tenderness. There is no rebound and no guarding.   Musculoskeletal:      Cervical back: Normal range of motion.     Lymphadenopathy:     She has no cervical adenopathy.   Neurological: She is alert and oriented to person, place, and time.         ED Course   Procedures  Labs Reviewed   INFLUENZA A & B BY MOLECULAR   SARS-COV-2 RNA AMPLIFICATION, QUAL   POCT URINE PREGNANCY          Imaging Results              X-Ray Chest PA And Lateral (Final result)  Result time 06/13/24 14:23:20      Final result by Harsh Carlos MD (06/13/24 14:23:20)                   Impression:      1.  Negative for acute process involving the chest.    2.  Incidental findings as noted above.      Electronically signed by: Harsh Carlos MD  Date:    06/13/2024  Time:    14:23               Narrative:    EXAMINATION:  XR CHEST PA AND LATERAL    CLINICAL HISTORY:  Wheezing    COMPARISON:  July 28, 2018    FINDINGS:  The lungs are clear. The cardiac silhouette size is normal. The trachea is midline and the mediastinal width is normal. Negative for focal infiltrate, effusion or pneumothorax. Pulmonary vasculature is normal. Negative for osseous abnormalities. Mild S-shaped curvature of the spine noted.  Clothing artifact.                                       Medications   albuterol-ipratropium 2.5  mg-0.5 mg/3 mL nebulizer solution 3 mL (3 mLs Nebulization Given 6/13/24 2027)     Medical Decision Making  Patient is well-appearing, afebrile 44 y.o. female. VSS and do not suggest sepsis. Denies chest pain, SOB, wheezing, difficulty swallowing, neck pain, neck stiffness. Lung sounds are clear and not consistent with pneumonia. There is no neck pain or limited ROM to suggest retropharyngeal abscess or meningitis. Tolerating PO, non-toxic appearing, no hypoxia. The patient remained comfortable and stable during their visit in the ED.  Details of ED course documented in ED workup.     Differential Diagnosis includes, but is not limited to: COVID, influenza, viral URI, bacterial/viral pharyngitis, pneumonia, PTA, sinusitis    All historical, clinical, and laboratory findings reviewed. COVID test negative. Influenza test negative. Patient with constellation of symptoms most consistent with a viral URI.  No acute abnormalities on chest x-ray.  Wheezing improved with breathing treatments.  There are no concerning features on physical exam to suggest an emergent or life threatening condition or an invasive bacterial infection, including, but not limited to: bacterial otitis media/externa, sinusitis, pharyngitis, or peritonsillar abscess. No further intervention is indicated at this time. The patient is at low risk for an emergent/life threatening medical condition at this time, and I am of the belief that that it is safe to discharge the patient from the emergency department.     Patient instructed to follow up with PCP in 2-3 days for recheck of today's complaints. Patient has been counseled regarding the need for follow-up as well as the indications to return to the emergency room should new or worrisome developments. Discharge and follow-up instructions discussed with the patient who expressed understanding and willingness to comply with recommendations. Patient discharged from the emergency department in stable  condition, in no acute distress.     Amount and/or Complexity of Data Reviewed  Labs: ordered. Decision-making details documented in ED Course.  Radiology: ordered.    Risk  OTC drugs.  Prescription drug management.               ED Course as of 06/13/24 1514   Thu Jun 13, 2024   1352 Patient examined and assessed. Patient answering questions appropriately, speaking in complete sentences, respirations are even and unlabored.  AAO x 4.  [OB]   1448 Chest x-ray independently reviewed:  No acute abnormalities noted [OB]   1448 Influenza A, Molecular: Negative [OB]   1448 Influenza B, Molecular: Negative [OB]   1448 SARS-CoV-2 RNA, Amplification, Qual: Negative [OB]   1506 Wheezing has improved since breathing treatment.  We will discharge patient with short course of steroids, Mucinex, Flonase, Zyrtec. [OB]      ED Course User Index  [OB] Gayla Fraser PA-C                           Clinical Impression:  Final diagnoses:  [R06.2] Wheezing  [J06.9] Viral URI with cough (Primary)          ED Disposition Condition    Discharge Stable          ED Prescriptions       Medication Sig Dispense Start Date End Date Auth. Provider    predniSONE (DELTASONE) 20 MG tablet Take 1 tablet (20 mg total) by mouth once daily. for 5 days 5 tablet 6/13/2024 6/18/2024 Gayla Fraser PA-C    albuterol (PROVENTIL/VENTOLIN HFA) 90 mcg/actuation inhaler Inhale 1-2 puffs into the lungs every 6 (six) hours as needed for Wheezing. Rescue 8 g 6/13/2024 6/13/2025 Gayla Fraser PA-C    cetirizine (ZYRTEC) 10 MG tablet Take 1 tablet (10 mg total) by mouth once daily. for 14 days 14 tablet 6/13/2024 6/27/2024 Gayla Fraser PA-C    benzonatate (TESSALON) 100 MG capsule Take 1 capsule (100 mg total) by mouth 2 (two) times a day. for 5 days 10 capsule 6/13/2024 6/18/2024 Gayla Fraser PA-C    guaiFENesin (MUCINEX) 600 mg 12 hr tablet Take 2 tablets (1,200 mg total) by mouth 2 (two) times daily. for 10 days 40 tablet 6/13/2024 6/23/2024 Gayla Fraser PA-C     azelastine (ASTELIN) 137 mcg (0.1 %) nasal spray 1 spray (137 mcg total) by Nasal route 2 (two) times daily. 30 mL 6/13/2024 6/13/2025 Gayla Fraser PA-C          Follow-up Information    None          Gayla Fraser PA-C  06/13/24 9891